# Patient Record
Sex: FEMALE | Race: BLACK OR AFRICAN AMERICAN | HISPANIC OR LATINO | Employment: UNEMPLOYED | URBAN - METROPOLITAN AREA
[De-identification: names, ages, dates, MRNs, and addresses within clinical notes are randomized per-mention and may not be internally consistent; named-entity substitution may affect disease eponyms.]

---

## 2023-11-23 ENCOUNTER — HOSPITAL ENCOUNTER (EMERGENCY)
Facility: OTHER | Age: 43
Discharge: HOME | End: 2023-11-23
Attending: EMERGENCY MEDICINE

## 2023-11-23 VITALS
TEMPERATURE: 98 F | HEIGHT: 64 IN | WEIGHT: 191.38 LBS | RESPIRATION RATE: 16 BRPM | BODY MASS INDEX: 32.67 KG/M2 | DIASTOLIC BLOOD PRESSURE: 84 MMHG | SYSTOLIC BLOOD PRESSURE: 146 MMHG | OXYGEN SATURATION: 100 % | HEART RATE: 107 BPM

## 2023-11-23 DIAGNOSIS — L98.9 SKIN LESION: Primary | ICD-10-CM

## 2023-11-23 PROCEDURE — 99283 EMERGENCY DEPT VISIT LOW MDM: CPT

## 2023-11-23 RX ORDER — HYDROCORTISONE 25 MG/G
CREAM TOPICAL 2 TIMES DAILY
Qty: 28 G | Refills: 0 | Status: SHIPPED | OUTPATIENT
Start: 2023-11-23 | End: 2023-11-30

## 2023-11-23 NOTE — ED PROVIDER NOTES
"Source of History:  Patient    Chief complaint:  Pelvic Pain (Pelvic itching and inflammation xMonths. Denies any recent unprotected intercourse. Denies abd pain, vaginal discharge.)      HPI:  Sarah Martínez is a 43 y.o. female presenting with c/o lesion to mons pubis x "months".  Reports itching.  No pain, no drainage.  No trauma.      This is the extent to the patients complaints today here in the emergency department.    PMH:  As per HPI and below:  History reviewed. No pertinent past medical history.  History reviewed. No pertinent surgical history.    Social History     Tobacco Use    Smoking status: Never    Smokeless tobacco: Never   Substance Use Topics    Alcohol use: Not Currently    Drug use: Never     Review of patient's allergies indicates:  No Known Allergies    ROS: As per HPI and below:  Constitutional: No fever.  No chills.  Integument: mons pubic lesion      Physical Exam:    BP (!) 146/84 (BP Location: Left arm, Patient Position: Sitting)   Pulse 107   Temp 98.4 °F (36.9 °C) (Oral)   Resp 16   Ht 5' 4" (1.626 m)   Wt 86.8 kg (191 lb 5.8 oz)   SpO2 100%   Breastfeeding No   BMI 32.85 kg/m²   Vitals:    11/23/23 1223   BP: (!) 146/84   Pulse: 107   Resp: 16   Temp: 98.4 °F (36.9 °C)   TempSrc: Oral   SpO2: 100%   Weight: 86.8 kg (191 lb 5.8 oz)   Height: 5' 4" (1.626 m)       Nurse's notes vitals reviewed  Constitutional: Patient alert and oriented well-developed well-nourished  Eyes:  Normal conjunctiva.  Extraocular muscles are intact.  ENT: Oral mucosa moist  : keloid appearing lesion, nontender, no fluctuance, no induration, no erythema.    Musculoskeletal: Normocephalic atraumatic, normal range of motion, no obvious deformities  Neuro: alert and oriented x3,  no focal neurological deficits.  Psych: Appropriate, conversant          Labs Reviewed - No data to display    No orders to display         Initial Impression/ Differential Dx:  Herpes, syphilis, keloid, " rash    MDM:    43 y.o. female with lesion to mons pubis x months, no pain.  On exam it appears to be a keloid, possibly from shaving?  Will dc with hydrocortisone cream and f/u with derm.  Referral was placed.           Diagnostic Impression:    1. Skin lesion         ED Disposition Condition    Discharge Stable            ED Prescriptions       Medication Sig Dispense Start Date End Date Auth. Provider    hydrocortisone 2.5 % cream Apply topically 2 (two) times daily. for 7 days 28 g 11/23/2023 11/30/2023 Baylee Ervin, CINDI          Follow-up Information       Follow up With Specialties Details Why Contact Info    [facility] Dept Emergency Medicine Go to  If symptoms worsen [address] [facility] [address] [phone]    [facility]  Schedule an appointment as soon as possible for a visit   [address] [place] [id number]    [phone]             Baylee Ervin, CINDI  11/24/23 1271

## 2023-11-23 NOTE — ED TRIAGE NOTES
"42 yo female reports to ed with co two pumps to the anterior aspect of pubic area x 3 moths. Pt reports itching to area, denies pain. Denies NVD/abd pain. Denies vaginal discharge or other  symptoms. Denies worsening of symptoms, states, "I just felt like coming in today".   "